# Patient Record
Sex: MALE | Race: WHITE
[De-identification: names, ages, dates, MRNs, and addresses within clinical notes are randomized per-mention and may not be internally consistent; named-entity substitution may affect disease eponyms.]

---

## 2017-08-02 ENCOUNTER — HOSPITAL ENCOUNTER (EMERGENCY)
Dept: HOSPITAL 62 - ER | Age: 47
Discharge: HOME | End: 2017-08-02
Payer: SELF-PAY

## 2017-08-02 VITALS — DIASTOLIC BLOOD PRESSURE: 82 MMHG | SYSTOLIC BLOOD PRESSURE: 135 MMHG

## 2017-08-02 DIAGNOSIS — R68.89: ICD-10-CM

## 2017-08-02 DIAGNOSIS — M25.562: Primary | ICD-10-CM

## 2017-08-02 DIAGNOSIS — R22.42: ICD-10-CM

## 2017-08-02 DIAGNOSIS — Z72.0: ICD-10-CM

## 2017-08-02 DIAGNOSIS — Z79.899: ICD-10-CM

## 2017-08-02 PROCEDURE — 99283 EMERGENCY DEPT VISIT LOW MDM: CPT

## 2017-08-02 NOTE — ER DOCUMENT REPORT
ED General





- General


Chief Complaint: Leg Pain


Stated Complaint: LEFT LEG PAIN


Time Seen by Provider: 08/02/17 22:06


Notes: 


Patient is a 38-year-old male presents with complaint of pain behind his left 

knee.  Patient says it has been there for approximately week.  He feels a small 

swollen area behind his knee.  The remainder of his leg is nontender.  He does 

work as a  and does do a lot of squatting.  Patient also mentions that 

he does take testosterone for bodybuilding.  She denies any fevers.  No 

vomiting.  No diarrhea.  No chest pain.  Patient's second complaint is that at 

times he will get some pressure behind his ears.  Patient says it seems to 

happen more when he is angry.  He denies any actual headache.  He denies any 

focal weakness or numbness.  No fevers.  He does not take blood pressure 

medications.  He again says his chest pressure behind his ears.  He says he 

does not really have any pressure into the top of his head.  He does chewing 

tobacco.  He does not smoke.  He does drink alcohol daily.  He denies recent 

URI.





- Related Data


Allergies/Adverse Reactions: 


 





No Known Allergies Allergy (Unverified 03/12/12 22:59)


 











Past Medical History





- Social History


Smoking Status: Never Smoker


Chew tobacco use (# tins/day): Yes


Frequency of alcohol use: Heavy


Drug Abuse: None


Family History: Reviewed & Not Pertinent


Patient has suicidal ideation: No


Patient has homicidal ideation: No


Renal/ Medical History: Denies: Hx Peritoneal Dialysis





- Immunizations


Hx Diphtheria, Pertussis, Tetanus Vaccination: Yes





Review of Systems





- Review of Systems


Notes: 


My Normal Review Basic





REVIEW OF SYSTEMS:


CONSTITUTIONAL :  Denies fever,  chills, or sweats.  Denies recent illness.


EENT:   Occasional pressure behind ears.


CARDIOVASCULAR:  Denies chest pain.


RESPIRATORY:  Denies cough, cold, or chest congestion.  Denies shortness of 

breath, difficulty breathing, or wheezing.


GASTROINTESTINAL:  Denies abdominal pain.  Denies nausea, vomiting, or 

diarrhea.  Denies constipation.  Last BM: 


MUSCULOSKELETAL: Pain behind left knee.


SKIN:   Denies rash or skin lesions.


NEUROLOGICAL:  Denies altered mental status or loss of consciousness.  Denies 

headache.  Denies weakness or paralysis or loss of use of either side.  Denies 

problems with gait or speech.  Denies sensory or motor loss.


ALL OTHER SYSTEMS REVIEWED AND NEGATIVE.





Physical Exam





- Vital signs


Vitals: 


 











Temp Pulse Resp BP Pulse Ox


 


 98 F   81   18   158/84 H  97 


 


 08/02/17 21:22  08/02/17 21:22  08/02/17 21:22  08/02/17 21:22  08/02/17 21:22














- Notes


Notes: 


General Appearance: Well nourished, alert, cooperative, no acute distress, no 

obvious discomfort.  Well-appearing.


Vitals: reviewed, See vital signs table.


Head: no swelling or tenderness to the head


Eyes: PERRL, EOMI, Conjuctiva clear


Ears: Normal-appearing tympanic membranes bilaterally.


Mouth: No decreasd moisture


Lungs: No wheezing, No rales, No rhonci, No accessory muscle use, good air 

exchange bilaterally.


Heart: Normal rate, Regular rythm, No murmur, no rub


Extremities: strength 5/5 in all extremities, good pulses in all extremities, 

more area of swelling behind left knee.  Slight tenderness to palpation 

bilateral left knee.  Remainder of left lower extremity is nontender and there 

is no edema.  No redness.  No warmth.  Right lower extremity is normal.  

Bedside ultrasound was performed.  Popliteal vein appears compressible on 

bedside ultrasound.  Left femoral vein is fully compressible with good 

augmentation.


Skin: warm, dry, appropriate color, no rash


Neuro: speech clear, oriented x 3, normal affect, responds appropriately to 

questions.  Renal nerves II through XII are intact.  Distal sensation intact.  

Patient moves all extremities without difficulty.





Course





- Re-evaluation


Re-evalutation: 





08/02/17 23:21


I suspect the patient most likely is a Baker's cyst behind his knee based on 

his job and the small amount of localized swelling just behind the left knee 

itself.  I did do a bedside ultrasound to look for evidence of DVT.  I did not 

see any.  I did explain to the patient length that it is less likely has a DVT 

however the bedside ultrasound is not 100% and that still very important that 

he follows up tomorrow for official venous ultrasound of the left lower 

extremity.  I did write prescription for him to have this performed.  Patient 

agrees to do this.  Patient is on testosterone which puts him at risk for 

developing clots.  The testosterone is not prescribed.  I informed him that he 

must stop taking this as it can cause multiple medical problems including heart 

attack and cardiomyopathy.  Patient agrees.  Patient will be discharged home.  

Patient encouraged to return to ER if he has fevers, chest pain, difficulty 

breathing, severe headache, vomiting, or worsening swelling or pain in the leg.

  Patient agrees with plan will be discharged home.  Patient occasionally has a 

pressure behind his ears.  He denies any actual true headache.  He has no neck 

pain.  I encouraged him to quit all tobacco use including chewing tobacco.





Dictation of this chart was performed using voice recognition software; 

therefore, there may be some unintended grammatical errors.





- Vital Signs


Vital signs: 


 











Temp Pulse Resp BP Pulse Ox


 


 98 F   66   14   135/82 H  98 


 


 08/02/17 21:22  08/02/17 22:32  08/02/17 22:32  08/02/17 22:32  08/02/17 22:32














Discharge





- Discharge


Clinical Impression: 


Leg pain


Qualifiers:


 Laterality: left Qualified Code(s): M79.605 - Pain in left leg





Condition: Good


Disposition: HOME, SELF-CARE


Additional Instructions: 


Please return to the ER immediately if you develop worsening leg pain or leg 

swelling, fevers, severe headache, vomiting, difficulty breathing, or feel 

unwell. Please call the number on the prescription tomorrow morning to arrange 

for the ultrasound to evaluate further for the possibility of a blood clot. I 

did perform a ultrasound today which did not show evidence of a blood clot, but 

the bedside ultrasound I did is limited and not 100% and that is why it is very 

oimportant you still have the official outpatient venous ultrasound. Please 

stop chewing tobacco.


Forms:  Follow-Up Outpatient Testing

## 2017-08-07 ENCOUNTER — HOSPITAL ENCOUNTER (OUTPATIENT)
Dept: HOSPITAL 62 - SP | Age: 47
End: 2017-08-07
Attending: EMERGENCY MEDICINE
Payer: SELF-PAY

## 2017-08-07 DIAGNOSIS — M79.605: Primary | ICD-10-CM

## 2017-08-07 PROCEDURE — 93971 EXTREMITY STUDY: CPT

## 2017-08-07 NOTE — XCELERA REPORT
[address] [address]

                             Tel: [phone]

                             Fax: [phone]



                     Lower Extremity Venous Evaluation

____________________________________________________________________________



Name: STEFAN CORDOBA

MRN: [id number]                Age: 46 yrs

Gender: Male                   : 1970

Patient Status: Outpatient     Patient Location: 

Account #: [id number]

Study Date: 2017 09:42 AM

Accession #: [id number]

____________________________________________________________________________



Procedure: Color flow and duplex imaging of the veins of the left lower

extremity as well as the right Common Femoral vein.

Reason For Study: LLE PAIN





Ordering Physician: VERA NEVAREZ

Performed By: Sima Sorto

____________________________________________________________________________



____________________________________________________________________________





Right Sided Venous Evaluation

The right common femoral vein is fully compressible. Spontaneous and phasic

flow is present in the right common femoral vein.



Left Sided Venous Evaluation

Normal vessel filling wall to wall, compression and augmentation as well as

Colour flow down to the infrageniculate veins.

____________________________________________________________________________





Interpretation Summary

No duplex evidence of DVT or obstruction in the left lower extremity nor in

the right Common Femoral vein.

____________________________________________________________________________



____________________________________________________________________________



Electronically signed by:      Lennox Williams      on 2017 04:15 PM



CC: VERA NEVAREZ

>

Williams, Lennox

## 2019-07-14 ENCOUNTER — HOSPITAL ENCOUNTER (EMERGENCY)
Dept: HOSPITAL 62 - ER | Age: 49
LOS: 1 days | Discharge: HOME | End: 2019-07-15
Payer: SELF-PAY

## 2019-07-14 DIAGNOSIS — R51: ICD-10-CM

## 2019-07-14 DIAGNOSIS — M79.601: ICD-10-CM

## 2019-07-14 DIAGNOSIS — R06.02: ICD-10-CM

## 2019-07-14 DIAGNOSIS — R07.9: Primary | ICD-10-CM

## 2019-07-14 DIAGNOSIS — Z87.891: ICD-10-CM

## 2019-07-14 PROCEDURE — 85025 COMPLETE CBC W/AUTO DIFF WBC: CPT

## 2019-07-14 PROCEDURE — 82550 ASSAY OF CK (CPK): CPT

## 2019-07-14 PROCEDURE — 93010 ELECTROCARDIOGRAM REPORT: CPT

## 2019-07-14 PROCEDURE — 36415 COLL VENOUS BLD VENIPUNCTURE: CPT

## 2019-07-14 PROCEDURE — 80053 COMPREHEN METABOLIC PANEL: CPT

## 2019-07-14 PROCEDURE — 93005 ELECTROCARDIOGRAM TRACING: CPT

## 2019-07-14 PROCEDURE — 99284 EMERGENCY DEPT VISIT MOD MDM: CPT

## 2019-07-14 PROCEDURE — 71045 X-RAY EXAM CHEST 1 VIEW: CPT

## 2019-07-14 PROCEDURE — 84484 ASSAY OF TROPONIN QUANT: CPT

## 2019-07-15 VITALS — DIASTOLIC BLOOD PRESSURE: 73 MMHG | SYSTOLIC BLOOD PRESSURE: 119 MMHG

## 2019-07-15 LAB
ADD MANUAL DIFF: NO
ALBUMIN SERPL-MCNC: 3.9 G/DL (ref 3.5–5)
ALP SERPL-CCNC: 66 U/L (ref 38–126)
ALT SERPL-CCNC: 48 U/L (ref 21–72)
ANION GAP SERPL CALC-SCNC: 9 MMOL/L (ref 5–19)
AST SERPL-CCNC: 27 U/L (ref 17–59)
BASOPHILS # BLD AUTO: 0.1 10^3/UL (ref 0–0.2)
BASOPHILS NFR BLD AUTO: 0.6 % (ref 0–2)
BILIRUB DIRECT SERPL-MCNC: 0.3 MG/DL (ref 0–0.4)
BILIRUB SERPL-MCNC: 0.3 MG/DL (ref 0.2–1.3)
BUN SERPL-MCNC: 10 MG/DL (ref 7–20)
CALCIUM: 9.7 MG/DL (ref 8.4–10.2)
CHLORIDE SERPL-SCNC: 105 MMOL/L (ref 98–107)
CK SERPL-CCNC: 92 U/L (ref 55–170)
CO2 SERPL-SCNC: 27 MMOL/L (ref 22–30)
EOSINOPHIL # BLD AUTO: 0.7 10^3/UL (ref 0–0.6)
EOSINOPHIL NFR BLD AUTO: 7.9 % (ref 0–6)
ERYTHROCYTE [DISTWIDTH] IN BLOOD BY AUTOMATED COUNT: 14.2 % (ref 11.5–14)
GLUCOSE SERPL-MCNC: 103 MG/DL (ref 75–110)
HCT VFR BLD CALC: 49.2 % (ref 37.9–51)
HGB BLD-MCNC: 16.4 G/DL (ref 13.5–17)
LYMPHOCYTES # BLD AUTO: 3.1 10^3/UL (ref 0.5–4.7)
LYMPHOCYTES NFR BLD AUTO: 37.1 % (ref 13–45)
MCH RBC QN AUTO: 30.4 PG (ref 27–33.4)
MCHC RBC AUTO-ENTMCNC: 33.3 G/DL (ref 32–36)
MCV RBC AUTO: 91 FL (ref 80–97)
MONOCYTES # BLD AUTO: 1.1 10^3/UL (ref 0.1–1.4)
MONOCYTES NFR BLD AUTO: 12.8 % (ref 3–13)
NEUTROPHILS # BLD AUTO: 3.4 10^3/UL (ref 1.7–8.2)
NEUTS SEG NFR BLD AUTO: 41.6 % (ref 42–78)
PLATELET # BLD: 241 10^3/UL (ref 150–450)
POTASSIUM SERPL-SCNC: 4.6 MMOL/L (ref 3.6–5)
PROT SERPL-MCNC: 6.7 G/DL (ref 6.3–8.2)
RBC # BLD AUTO: 5.4 10^6/UL (ref 4.35–5.55)
SODIUM SERPL-SCNC: 140.8 MMOL/L (ref 137–145)
TOTAL CELLS COUNTED % (AUTO): 100 %
WBC # BLD AUTO: 8.2 10^3/UL (ref 4–10.5)

## 2019-07-15 NOTE — RADIOLOGY REPORT (SQ)
EXAM DESCRIPTION: X-ray single view chest.



CLINICAL HISTORY: 48 years Male, chest pain



COMPARISON: None.



TECHNIQUE: Single portable x-ray view of the chest performed on

7/15/2019 at 3:35 AM



FINDINGS: 

The lungs are well expanded and are clear. There is no evidence

of a pneumothorax.



The cardiac silhouette is normal in size and configuration.



The mediastinal contours are normal.



No acute osseous abnormality is identified.



No focal soft tissue abnormalities are seen.



Lines and tubes:  None.



IMPRESSION:

No evidence of acute intrathoracic disease.

## 2019-07-15 NOTE — ER DOCUMENT REPORT
ED General





- General


Chief Complaint: Headache


Stated Complaint: HEADACHE, CHEST PAIN, EARS RINGING


Time Seen by Provider: 07/15/19 02:27


Mode of Arrival: Ambulatory


Information source: Patient


Notes: 





Patient is a 48-year-old male presents emergency department chest pain over the 

last 3 days, radiation to the right arm and shortness of breath.  He reports he 

has been working outside a lot lately and has not been drinking much water.





TRAVEL OUTSIDE OF THE [place] IN LAST 30 DAYS: No





- Related Data


Allergies/Adverse Reactions: 


                                        





No Known Allergies Allergy (Unverified 03/12/12 22:59)


   











Past Medical History





- General


Information source: Patient





- Social History


Smoking Status: Former Smoker


Frequency of alcohol use: None


Drug Abuse: None


Family History: Reviewed & Not Pertinent


Patient has suicidal ideation: No


Patient has homicidal ideation: No





- Medical History


Medical History: Negative


Renal/ Medical History: Denies: Hx Peritoneal Dialysis


Surgical Hx: Negative





- Immunizations


Hx Diphtheria, Pertussis, Tetanus Vaccination: Yes





Review of Systems





- Review of Systems


Constitutional: Malaise


EENT: No symptoms reported


Cardiovascular: Chest pain


Respiratory: No symptoms reported


Gastrointestinal: No symptoms reported


Genitourinary: No symptoms reported


Male Genitourinary: No symptoms reported


Musculoskeletal: No symptoms reported


Skin: No symptoms reported


Hematologic/Lymphatic: No symptoms reported


Neurological/Psychological: No symptoms reported





Physical Exam





- Vital signs


Vitals: 


                                        











Temp Pulse Resp BP Pulse Ox


 


 97.5 F   79   18   140/88 H  97 


 


 07/14/19 22:12  07/14/19 22:12  07/14/19 22:12  07/14/19 22:12  07/14/19 22:12














- Notes


Notes: 





PHYSICAL EXAMINATION:





GENERAL: Well-appearing, well-nourished and in no acute distress.





HEAD: Atraumatic, normocephalic.





EYES: Pupils equal round and reactive to light, extraocular movements intact, 

sclera anicteric, conjunctiva are normal.





ENT: Nares patent, oropharynx clear without exudates.  Moist mucous membranes.





NECK: Normal range of motion, supple without lymphadenopathy





LUNGS: Breath sounds clear to auscultation bilaterally and equal.  No wheezes 

rales or rhonchi.





HEART: Regular rate and rhythm without murmurs





ABDOMEN: Soft, nontender, nondistended abdomen.  No guarding, no rebound.  No 

masses appreciated.





Musculoskeletal: Normal range of motion, no pitting or edema.  No cyanosis.





NEUROLOGICAL: Cranial nerves grossly intact.  Normal speech, normal gait.  

Normal sensory, motor exams 





PSYCH: Normal mood, normal affect.





SKIN: Warm, Dry, normal turgor, no rashes or lesions noted.





Course





- Re-evaluation


Re-evalutation: 





Patient appears well, nontoxic and vital signs are within normal limits.  

Patient reports that he has been working on the yard a lot in the last 2 days 

and has not had much water to drink.  He reports he has had intermittent chest 

pain however this all resolved prior to arrival.  He states the only reason he 

came here is at his wife's insistence.  He denies any acute complaints at this 

time.





His labs are unremarkable.  We did discussed doing a repeat troponin although I 

feel this is probably unnecessary as the chest pain has been completely resolved

now for at least 6 hours and his chest pain had been lasting the last 2 days.  

His EKG here was unremarkable, shows a sinus rhythm, rate of 63, normal axis, no

ST segment elevations or depressions to suggest ischemia.  His chest x-ray was 

negative and all of his labs were normal.  I feel that his malaise and chest 

pain are likely due to mild dehydration.  All test results were discussed with 

the patient, patient states he is ready to go home and will not stay any longer 

as he has to work in the morning.  ED return precautions were discussed to 

include worsening chest pain, shortness of breath, nausea, vomiting and all of 

this was discussed verbally with the patient.





The patient's emergency department workup and current diagnosis were explained 

to the patient and or family.  Follow-up instructions were provided.  

Medications if prescribed were discussed. Instructions for when to return to the

emergency department including specific worrisome symptoms were discussed with 

the patient and/or family.








- Vital Signs


Vital signs: 


                                        











Temp Pulse Resp BP Pulse Ox


 


 97.7 F   59 L  16   119/73   100 


 


 07/15/19 05:15  07/15/19 06:09  07/15/19 06:09  07/15/19 06:09  07/15/19 06:09














- Laboratory


Result Diagrams: 


                                 07/15/19 03:56





                                 07/15/19 05:15


Laboratory results interpreted by me: 


                                        











  07/15/19





  03:56


 


RDW  14.2 H


 


Seg Neutrophils %  41.6 L


 


Eosinophils %  7.9 H


 


Absolute Eosinophils  0.7 H














Discharge





- Discharge


Clinical Impression: 


Chest pain


Qualifiers:


 Chest pain type: unspecified Qualified Code(s): R07.9 - Chest pain, unspecified





Condition: Stable


Disposition: HOME, SELF-CARE


Additional Instructions: 


Your work-up today was unremarkable.  We ran lab work, and EKG and chest x-ray. 

All of these were normal today.  Your symptoms are most likely being caused by 

excessive amount of time spent outside working.  Please continue to stay 

hydrated drink at least 8 full glasses of water daily but increases if you are 

doing strenuous activities outside.  Please return to the emergency department 

for any new or worsening symptoms.

## 2019-08-10 ENCOUNTER — HOSPITAL ENCOUNTER (EMERGENCY)
Dept: HOSPITAL 62 - ER | Age: 49
Discharge: HOME | End: 2019-08-10
Payer: SELF-PAY

## 2019-08-10 VITALS — SYSTOLIC BLOOD PRESSURE: 106 MMHG | DIASTOLIC BLOOD PRESSURE: 87 MMHG

## 2019-08-10 DIAGNOSIS — S80.12XA: Primary | ICD-10-CM
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
## 2019-08-10 PROCEDURE — 99283 EMERGENCY DEPT VISIT LOW MDM: CPT

## 2019-08-10 NOTE — ER DOCUMENT REPORT
ED General





- General


Chief Complaint: Leg Injury


Stated Complaint: LEFT LEG PAIN


Time Seen by Provider: 08/10/19 06:11


TRAVEL OUTSIDE OF THE [place] IN LAST 30 DAYS: No





- HPI


Notes: 





Patient is a 48-year-old male who presents to the emergency department for 

evaluation of an injury to his left leg.  He states he fell off of his horse 

last night at about 8 PM.  He is concerned his leg might be broken.  He denies 

hitting his head.  No neck or back pain.  No loss of consciousness.  He denies 

any pain anywhere other than there.





- Related Data


Allergies/Adverse Reactions: 


                                        





No Known Allergies Allergy (Unverified 03/12/12 22:59)


   











Past Medical History





- General


Information source: Patient





- Social History


Smoking Status: Former Smoker


Frequency of alcohol use: Occasional


Family History: Reviewed & Not Pertinent, CAD


Patient has suicidal ideation: No


Patient has homicidal ideation: No


Renal/ Medical History: Denies: Hx Peritoneal Dialysis


Skin Medical History: Reports Hx MRSA





- Immunizations


Hx Diphtheria, Pertussis, Tetanus Vaccination: Yes





Review of Systems





- Review of Systems


Constitutional: No symptoms reported


EENT: No symptoms reported


Cardiovascular: No symptoms reported


Respiratory: No symptoms reported


Gastrointestinal: No symptoms reported


Genitourinary: No symptoms reported


Musculoskeletal: See HPI


Skin: No symptoms reported


Neurological/Psychological: No symptoms reported





Physical Exam





- Vital signs


Vitals: 


                                        











Temp Pulse Resp BP Pulse Ox


 


 97.3 F   96   14   134/78 H  96 


 


 08/10/19 04:19  08/10/19 04:19  08/10/19 04:19  08/10/19 04:19  08/10/19 04:19














- Notes


Notes: 





This is a pleasant 48-year-old male, appears stated age in no acute distress.  

Head is normocephalic and atraumatic.  Pupils are equal round, reactive to 

light.  Oral mucosa is moist.  Heart is regular rate and rhythm, lungs are clear

to auscultate bilaterally.  Abdomen is soft, nontender, normoactive bowel 

sounds.  Examination of the left lower extremity yields a hematoma to the mid 

lower leg, medial aspect.  It measures approximately 4 cm in diameter.  It is 

tender to the touch.  It is edematous but not tense.  Neurovascularly intact 

distally.  He has no fibular head tenderness, no medial or lateral malleolus 

tenderness to palpation.  No tenderness to bony palpation of the foot.





Course





- Re-evaluation


Re-evalutation: 





08/10/19 07:06


Patient presents emergency department for evaluation after a left lower leg inju

ry.  He does have a hematoma in that area.  I talked to the patient as well as 

his wife extensively about the possibility of the development of compartment 

syndrome.  We talked at length about symptoms that should prompt immediate 

return.  He is told to rest, elevate the leg.  He voiced understanding.  His 

wife did as well.  They are to return to the emergency department with worsening

or new concerning symptoms of any sort.





- Vital Signs


Vital signs: 


                                        











Temp Pulse Resp BP Pulse Ox


 


 97.9 F   91   16   106/87 H  100 


 


 08/10/19 07:25  08/10/19 07:25  08/10/19 07:25  08/10/19 07:25  08/10/19 07:25














Discharge





- Discharge


Clinical Impression: 


Hematoma of left lower extremity


Qualifiers:


 Encounter type: initial encounter Qualified Code(s): S80.12XA - Contusion of 

left lower leg, initial encounter





Condition: Stable


Disposition: HOME, SELF-CARE


Instructions:  Hematoma ([facility])


Additional Instructions: 


Elevate the leg when possible.  Tylenol as needed for pain.  If you develop 

worsening swelling, increased pain, numbness, coolness of the extremity, or any 

other concerning symptoms, this could be a sign of a surgical emergency called 

compartment syndrome.  You need to return immediately to the emergency 

department for reevaluation should any of the symptoms develop.

## 2020-05-20 ENCOUNTER — HOSPITAL ENCOUNTER (EMERGENCY)
Dept: HOSPITAL 62 - ER | Age: 50
Discharge: HOME | End: 2020-05-20
Payer: SELF-PAY

## 2020-05-20 VITALS — DIASTOLIC BLOOD PRESSURE: 90 MMHG | SYSTOLIC BLOOD PRESSURE: 134 MMHG

## 2020-05-20 DIAGNOSIS — L02.31: ICD-10-CM
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
## 2020-05-20 DIAGNOSIS — S30.0XXA: Primary | ICD-10-CM

## 2020-05-20 LAB
ADD MANUAL DIFF: NO
ALBUMIN SERPL-MCNC: 4.4 G/DL (ref 3.5–5)
ALP SERPL-CCNC: 74 U/L (ref 38–126)
ANION GAP SERPL CALC-SCNC: 9 MMOL/L (ref 5–19)
AST SERPL-CCNC: 35 U/L (ref 17–59)
BASOPHILS # BLD AUTO: 0 10^3/UL (ref 0–0.2)
BASOPHILS NFR BLD AUTO: 0.5 % (ref 0–2)
BILIRUB DIRECT SERPL-MCNC: 0 MG/DL (ref 0–0.4)
BILIRUB SERPL-MCNC: 0.3 MG/DL (ref 0.2–1.3)
BUN SERPL-MCNC: 17 MG/DL (ref 7–20)
CALCIUM: 9.9 MG/DL (ref 8.4–10.2)
CHLORIDE SERPL-SCNC: 100 MMOL/L (ref 98–107)
CO2 SERPL-SCNC: 29 MMOL/L (ref 22–30)
EOSINOPHIL # BLD AUTO: 0.3 10^3/UL (ref 0–0.6)
EOSINOPHIL NFR BLD AUTO: 3.6 % (ref 0–6)
ERYTHROCYTE [DISTWIDTH] IN BLOOD BY AUTOMATED COUNT: 14.4 % (ref 11.5–14)
GLUCOSE SERPL-MCNC: 85 MG/DL (ref 75–110)
HCT VFR BLD CALC: 47.7 % (ref 37.9–51)
HGB BLD-MCNC: 16.6 G/DL (ref 13.5–17)
LYMPHOCYTES # BLD AUTO: 1.9 10^3/UL (ref 0.5–4.7)
LYMPHOCYTES NFR BLD AUTO: 21.3 % (ref 13–45)
MCH RBC QN AUTO: 31.2 PG (ref 27–33.4)
MCHC RBC AUTO-ENTMCNC: 34.7 G/DL (ref 32–36)
MCV RBC AUTO: 90 FL (ref 80–97)
MONOCYTES # BLD AUTO: 1.2 10^3/UL (ref 0.1–1.4)
MONOCYTES NFR BLD AUTO: 12.8 % (ref 3–13)
NEUTROPHILS # BLD AUTO: 5.6 10^3/UL (ref 1.7–8.2)
NEUTS SEG NFR BLD AUTO: 61.8 % (ref 42–78)
PLATELET # BLD: 347 10^3/UL (ref 150–450)
POTASSIUM SERPL-SCNC: 4.5 MMOL/L (ref 3.6–5)
PROT SERPL-MCNC: 7.5 G/DL (ref 6.3–8.2)
RBC # BLD AUTO: 5.31 10^6/UL (ref 4.35–5.55)
TOTAL CELLS COUNTED % (AUTO): 100 %
WBC # BLD AUTO: 9 10^3/UL (ref 4–10.5)

## 2020-05-20 PROCEDURE — 76856 US EXAM PELVIC COMPLETE: CPT

## 2020-05-20 PROCEDURE — 85025 COMPLETE CBC W/AUTO DIFF WBC: CPT

## 2020-05-20 PROCEDURE — 80053 COMPREHEN METABOLIC PANEL: CPT

## 2020-05-20 PROCEDURE — 36415 COLL VENOUS BLD VENIPUNCTURE: CPT

## 2020-05-20 PROCEDURE — 99282 EMERGENCY DEPT VISIT SF MDM: CPT

## 2020-05-20 NOTE — ER DOCUMENT REPORT
ED General





- General


Chief Complaint: Skin Problem


Stated Complaint: ABSCESS/BUTTOCK


Time Seen by Provider: 05/20/20 19:07


Mode of Arrival: Ambulatory


TRAVEL OUTSIDE OF THE [place] IN LAST 30 DAYS: No





- HPI


Notes: 





Patient is a 49-year-old male who presents to the emergency department for 

evaluation.  He has twice weekly injections of testosterone in his buttocks, 

performed by his wife.  He alternates these injections in the left and right 

buttock, on Wednesday and Sundays.  He has had no problems or difficulties, 

until last week where 1 injection caused a "small knot."  He states that happens

intermittently.  He states that the next time they went to inject in that area, 

his wife adan back, and "got some pus."  He states it was milky and green.  He 

said no fevers or chills.  No nausea or vomiting.  Really no increased pain over

what his baseline is with these injections.  





- Related Data


Allergies/Adverse Reactions: 


                                        





No Known Allergies Allergy (Unverified 03/12/12 22:59)


   








Home Medications: testosterone injection





Past Medical History





- General


Information source: Patient





- Social History


Smoking Status: Never Smoker


Chew tobacco use (# tins/day): Yes


Frequency of alcohol use: Social


Drug Abuse: None


Family History: Reviewed & Not Pertinent, CAD


Patient has homicidal ideation: No


Renal/ Medical History: Denies: Hx Peritoneal Dialysis


Skin Medical History: Reports Hx MRSA





- Immunizations


Hx Diphtheria, Pertussis, Tetanus Vaccination: Yes





Review of Systems





- Review of Systems


Skin: See HPI


-: Yes All other systems reviewed and negative





Physical Exam





- Vital signs


Vitals: 





                                        











Temp Pulse Resp BP Pulse Ox


 


 98.9 F   82   16   142/75 H  97 


 


 05/20/20 18:44  05/20/20 18:44  05/20/20 18:44  05/20/20 18:44  05/20/20 18:44














- Notes


Notes: 





This is a pleasant 49-year-old male who appears stated age, no acute distress.  

Head normocephalic and atraumatic, pupils are equal round, reactive to light.  

Oral mucosa is moist.  Uvula is midline.  Heart regular rate and rhythm, lungs 

are clear to auscultation bilaterally.  Abdomen soft, nontender, normoactive 

bowel sounds.  Examination of bilateral buttock she has no skin changes.  No 

significant tenderness.  No palpable mass.





Course





- Re-evaluation


Re-evalutation: 





05/20/20 21:13


Patient presents to the emergency department for evaluation.  He had laboratory 

investigations and ultrasound is ordered through triage.  Ultrasound revealed 

findings consistent with a hematoma but no abscess.  Laboratory investigations 

are unremarkable.  He has no signs of overlying cellulitis.  We will have him 

follow-up with primary care, return to the ED with worsening.





- Vital Signs


Vital signs: 





                                        











Temp Pulse Resp BP Pulse Ox


 


 98.9 F   82   16   142/75 H  97 


 


 05/20/20 19:03  05/20/20 18:44  05/20/20 18:44  05/20/20 18:44  05/20/20 18:44














- Laboratory


Result Diagrams: 


                                 05/20/20 19:25





                                 05/20/20 19:25


Laboratory results interpreted by me: 





                                        











  05/20/20 05/20/20





  19:25 19:25


 


RDW  14.4 H 


 


ALT   53 H














Discharge





- Discharge


Clinical Impression: 


 Hematoma





Condition: Stable


Disposition: HOME, SELF-CARE


Instructions:  Hematoma ([facility])


Additional Instructions: 


Ultrasound revealed a hematoma, but no abscess.  Watch for signs of infection, 

including redness, increased pain, fevers, vomiting.  Return to the emergency 

department with worsening or new concerning symptoms of any sort.

## 2020-05-20 NOTE — RADIOLOGY REPORT (SQ)
EXAM DESCRIPTION:  U/S NON-OB PELVIS W/O DOP



IMAGES COMPLETED DATE/TIME:  5/20/2020 7:45 pm



REASON FOR STUDY:  Tenderness left outer buttocks



COMPARISON:  None.



TECHNIQUE:  Dynamic and static grayscale images acquired of the pelvis via transabdominal approach an
d recorded on PACS. Additional selected color Doppler and spectral images recorded.



LIMITATIONS:  None.



FINDINGS:  Sonographic imaging is performed in the area of concern in the outer left buttock.  Soft t
issue swelling.  There is an area there is echogenic that measures 4.8 x 2.4 x 1.4 cm.  This could re
present a resolving hematoma.  No abscess is appreciated.



IMPRESSION:  No abscess is seen.  Possible resolving hematoma.



COMMENT:  None



TECHNICAL DOCUMENTATION:  JOB ID:  [id number]

 2011 [employer]- All Rights Reserved                          Rev-[id number]



Reading location - IP/workstation name: [email or web address]

## 2020-05-20 NOTE — ER DOCUMENT REPORT
ED Medical Screen (RME)





- General


Chief Complaint: Abscess


Stated Complaint: ABSCESS/BUTTOCK


Time Seen by Provider: 05/20/20 19:07


Mode of Arrival: Ambulatory


Information source: Patient


Notes: 





49-year-old male presented to ED for complaint of pain to the left upper 

buttocks.  He states he gets testosterone shots in the buttocks and he had 1 a 

week and a half ago in his left buttocks.  He states it was sore when he went to

get the neck shot so use the other buttocks.  He states on Wednesday they went 

to give him another injection and the wife flew back pus from the area.  He 

states the wound has not gotten any better so he has come to the emergency room 

to be examined.  He is alert oriented respirations regular and unlabored 

speaking in full sentences.














I have greeted and performed a rapid initial assessment of this patient.  A 

comprehensive ED assessment and evaluation of the patient, analysis of test 

results and completion of medical decision making process will be conducted by 

an additional ED providers.


TRAVEL OUTSIDE OF THE [place] IN LAST 30 DAYS: No





- Related Data


Allergies/Adverse Reactions: 


                                        





No Known Allergies Allergy (Unverified 03/12/12 22:59)


   











Past Medical History


Renal/ Medical History: Denies: Hx Peritoneal Dialysis


Skin Medical History: Reports Hx MRSA





- Immunizations


Hx Diphtheria, Pertussis, Tetanus Vaccination: Yes





Physical Exam





- Vital signs


Vitals: 





                                        











Temp Pulse Resp BP Pulse Ox


 


 98.9 F   82   16   142/75 H  97 


 


 05/20/20 18:44  05/20/20 18:44  05/20/20 18:44  05/20/20 18:44  05/20/20 18:44














Course





- Vital Signs


Vital signs: 





                                        











Temp Pulse Resp BP Pulse Ox


 


 98.9 F   82   16   142/75 H  97 


 


 05/20/20 18:44  05/20/20 18:44  05/20/20 18:44  05/20/20 18:44  05/20/20 18:44